# Patient Record
Sex: MALE | Employment: OTHER | URBAN - METROPOLITAN AREA
[De-identification: names, ages, dates, MRNs, and addresses within clinical notes are randomized per-mention and may not be internally consistent; named-entity substitution may affect disease eponyms.]

---

## 2022-06-27 ENCOUNTER — OFFICE VISIT (OUTPATIENT)
Dept: NEUROLOGY | Age: 77
End: 2022-06-27
Payer: OTHER GOVERNMENT

## 2022-06-27 VITALS
SYSTOLIC BLOOD PRESSURE: 133 MMHG | WEIGHT: 158 LBS | HEIGHT: 70 IN | BODY MASS INDEX: 22.62 KG/M2 | DIASTOLIC BLOOD PRESSURE: 85 MMHG | HEART RATE: 99 BPM

## 2022-06-27 DIAGNOSIS — R29.6 FREQUENT FALLS: ICD-10-CM

## 2022-06-27 DIAGNOSIS — G62.9 PERIPHERAL POLYNEUROPATHY: ICD-10-CM

## 2022-06-27 DIAGNOSIS — R42 DIZZINESS: ICD-10-CM

## 2022-06-27 DIAGNOSIS — I95.1 POSTURAL HYPOTENSION: ICD-10-CM

## 2022-06-27 DIAGNOSIS — R26.89 ABNORMALITY OF GAIT DUE TO IMPAIRMENT OF BALANCE: Primary | ICD-10-CM

## 2022-06-27 PROCEDURE — 1123F ACP DISCUSS/DSCN MKR DOCD: CPT | Performed by: PSYCHIATRY & NEUROLOGY

## 2022-06-27 PROCEDURE — 99205 OFFICE O/P NEW HI 60 MIN: CPT | Performed by: PSYCHIATRY & NEUROLOGY

## 2022-06-27 RX ORDER — ALOGLIPTIN 12.5 MG/1
12.5 TABLET, FILM COATED ORAL DAILY
COMMUNITY

## 2022-06-27 RX ORDER — METOPROLOL SUCCINATE 100 MG/1
100 TABLET, EXTENDED RELEASE ORAL DAILY
COMMUNITY

## 2022-06-27 RX ORDER — TRAZODONE HYDROCHLORIDE 100 MG/1
100 TABLET ORAL DAILY
COMMUNITY

## 2022-06-27 RX ORDER — LATANOPROST 50 UG/ML
1 SOLUTION/ DROPS OPHTHALMIC
COMMUNITY

## 2022-06-27 RX ORDER — ROSUVASTATIN CALCIUM 5 MG/1
5 TABLET, COATED ORAL
COMMUNITY

## 2022-06-27 RX ORDER — GLIPIZIDE 10 MG/1
10 TABLET ORAL
COMMUNITY

## 2022-06-27 RX ORDER — LEVOTHYROXINE SODIUM 137 UG/1
137 TABLET ORAL DAILY
COMMUNITY

## 2022-06-27 RX ORDER — BRIMONIDINE TARTRATE, TIMOLOL MALEATE 2; 5 MG/ML; MG/ML
SOLUTION/ DROPS OPHTHALMIC
COMMUNITY

## 2022-06-27 RX ORDER — LISINOPRIL 20 MG/1
20 TABLET ORAL DAILY
COMMUNITY

## 2022-06-27 RX ORDER — SERTRALINE HYDROCHLORIDE 100 MG/1
100 TABLET, FILM COATED ORAL DAILY
COMMUNITY

## 2022-06-27 ASSESSMENT — ENCOUNTER SYMPTOMS
ABDOMINAL PAIN: 0
COUGH: 0
BACK PAIN: 1

## 2022-06-27 NOTE — PATIENT INSTRUCTIONS
Your neurologic exam shows some findings that would be concerning for a peripheral neuropathy affecting her feet. I suspect this may be causing some of your balance issues and resulting in falls. I would like to bring you back for a nerve conduction study of your legs. This will be scheduled for you up front. They will also make you a follow-up to come back and see me afterwards so that we can further discuss the results. In the meantime I would recommend that she use a cane for assistance when walking.

## 2022-06-27 NOTE — PROGRESS NOTES
Josse Coe  [address] [place] [address] Dr, [facility], [address] [place] [address]  Phone: [phone] Fax [phone]  Cipriano Marino MD    Patient: Manasa Hyman. Provider: Cipriano Marino MD    CC:   Chief Complaint   Patient presents with    New Patient     Referring Provider:    History of Present Illness:     Manasa Hyman. is a 68 y.o. RH male who presents for further evaluation of falls. He is unaccompanied for today's visit. Patient is seen today for further evaluation of gait disturbance highlighted by balance difficulty and multiple falls. Symptoms are nonspecific but it appears that he has had several instances of falls over the last year. He actually states the symptoms have gradually improved since the time of the referral which is reassuring. Upon questioning, some of these findings have been related to postural dizziness. He describes one incident where he was in the cardiology office and he stood from his chair and became dizzy and fell backwards. He denies any syncope or any vertigo type symptoms. He does note that blood pressure may run low and there have been changes in his blood pressure medications recently. He is at other instances where he is tripped and these may become more problematic when he was rushed there are not being as careful. He says he has a tendency to have to  his feet. He is prone to tripping. In general he has unsteadiness of gait and frequent loss of balance. Patient has a cane at home but does not regularly use it. He does have some occasional low back pain but though this does not seem to be very problematic. Denies any numbness or tingling in the lower extremities. No weakness, numbness, or loss of dexterity in the hands. No diplopia, speech changes, swallowing disturbances. He does have some urinary urgency/frequency which is attributed to BPH. Review of Systems:   Review of Systems   Constitutional: Negative for fatigue.    HENT: Negative for congestion. Eyes: Negative for visual disturbance. Respiratory: Negative for cough. Gastrointestinal: Negative for abdominal pain. Genitourinary: Positive for frequency and urgency. Musculoskeletal: Positive for back pain and gait problem. Skin: Negative for rash. Allergic/Immunologic: Negative for immunocompromised state. Neurological: Positive for tremors and weakness. Psychiatric/Behavioral: Negative for confusion and hallucinations. Lab/Imaging Review:   I REVIEWED PERTINENT LABS, IMAGES, AND REPORTS WITH THE PATIENT PERSONALLY, DIRECTLY AND FULLY. THE MOST PERTINENT FINDINGS ARE NOTED BELOW:    N/A    Past Medical History:     Past medical history, surgical history, social history, family history, medications, and allergies were reviewed and updated as appropriate. PAST MEDICAL HISTORY:  Past Medical History:   Diagnosis Date    Generalized anxiety disorder     Hypertension     Hypothyroidism     Major depressive disorder     Mixed hyperlipidemia     Osteoarthritis     Type 2 diabetes mellitus ([facility] [address]      PAST SURGICAL HISTORY:   No past surgical history on file. FAMILY HISTORY:  No family history on file.      SOCIAL HISTORY:  Social History     Socioeconomic History    Marital status:      Spouse name: None    Number of children: None    Years of education: None    Highest education level: None   Occupational History    None   Tobacco Use    Smoking status: Former Smoker    Smokeless tobacco: Never Used   Substance and Sexual Activity    Alcohol use: None    Drug use: None    Sexual activity: None   Other Topics Concern    None   Social History Narrative    None     Social Determinants of Health     Financial Resource Strain:     Difficulty of Paying Living Expenses: Not on file   Food Insecurity:     Worried About Running Out of Food in the Last Year: Not on file    Oniel of Food in the Last Year: Not on file   Transportation Needs:  Lack of Transportation (Medical): Not on file    Lack of Transportation (Non-Medical):  Not on file   Physical Activity:     Days of Exercise per Week: Not on file    Minutes of Exercise per Session: Not on file   Stress:     Feeling of Stress : Not on file   Social Connections:     Frequency of Communication with Friends and Family: Not on file    Frequency of Social Gatherings with Friends and Family: Not on file    Attends Yarsanism Services: Not on file    Active Member of [address] [place] [address] or Organizations: Not on file    Attends Club or Organization Meetings: Not on file    Marital Status: Not on file   Intimate Partner Violence:     Fear of Current or Ex-Partner: Not on file    Emotionally Abused: Not on file    Physically Abused: Not on file    Sexually Abused: Not on file   Housing Stability:     Unable to Pay for Housing in the Last Year: Not on file    Number of [place] in the Last Year: Not on file    Unstable Housing in the Last Year: Not on file       Medications/Allergies:     MEDICATIONS:   Outpatient Encounter Medications as of 6/27/2022   Medication Sig Dispense Refill    traZODone (DESYREL) 100 MG tablet Take 100 mg by mouth daily      sertraline (ZOLOFT) 100 MG tablet Take 100 mg by mouth daily      apixaban (ELIQUIS) 5 MG TABS tablet Take 5 mg by mouth 2 times daily      metFORMIN (GLUCOPHAGE) 500 MG tablet Take 750 mg by mouth 2 times daily       levothyroxine (SYNTHROID) 137 MCG tablet Take 137 mcg by mouth daily      glipiZIDE (GLUCOTROL) 10 MG tablet Take 10 mg by mouth      lisinopril (PRINIVIL;ZESTRIL) 20 MG tablet Take 20 mg by mouth daily      rosuvastatin (CRESTOR) 5 MG tablet Take 5 mg by mouth      alogliptin (NESINA) 12.5 MG TABS tablet Take 12.5 mg by mouth daily      brimonidine-timolol (COMBIGAN) 0.2-0.5 % ophthalmic solution       cyanocobalamin 1000 MCG tablet 6000 mg daily      empagliflozin (JARDIANCE) 25 MG tablet Take 12.5 mg by mouth daily      latanoprost (XALATAN) 0.005 % ophthalmic solution 1 drop      metoprolol succinate (TOPROL XL) 100 MG extended release tablet Take 100 mg by mouth daily       No facility-administered encounter medications on file as of 6/27/2022. ALLERGIES:  Allergies   Allergen Reactions    Penicillins        Physical Exam:     /85   Pulse 99   Ht 5' 10\" (1.778 m)   Wt 158 lb (71.7 kg)   BMI 22.67 kg/m²     General Exam:  General: Well developed, well nourished, in no apparent distress. HEENT: Normocephalic, atraumatic. Sclera anicteric. Oropharynx clear. Neck: Supple without masses  Cardiovascular: Regular rate and rhythm. No carotid bruits. Lungs: Non-labored breathing. Abdomen: Soft, nontender, nondistended. Extremities: Peripheral pulses intact. No edema and no rashes. Neurological Exam:     MS/Language/Speech:  Alert. Oriented to person, place, and time. Language fluent. Speech normal.     Cranial Nerves: PERRL. Eye movements full with normal pursuits. No nystagmus. Face was symmetric with good activation and normal sensation. Tongue and palate were midline. Shoulder shrug symmetric. Motor: Strength was intact throughout with exception of mild hip flexor weakness bilaterally. Ankle plantarflexion and dorsiflexion are intact. Tone was normal.     Abnormal Movements: There was no tremor or hyperkinetic movements. No bradykinesia. Sensory: Absent vibratory sensation at the toes and ankles. Pinprick is relatively intact. Vibratory sensation intact in the hands. Cerebellar: No ataxia or dysmetria with finger-nose-finger bilaterally. Reflexes (R/L): Biceps (2+/2+), Brachioradialis (2+/2+), Patellar (2+/2+), Ankle (0+/0+). Bedolla's was negative and plantar responses were flexor. Gait: He is slow to rise from a seated position. He walks with a widened base and an unsteady gait with a somewhat staggering appearance. No parkinsonian features are observed.       Assessment and Plan: Corby Wilks. is a 68 y.o. male who presents with the following issues:     Georgi Astorga was seen today for new patient. Diagnoses and all orders for this visit:    Abnormality of gait due to impairment of balance    Peripheral polyneuropathy    Frequent falls    Dizziness    Postural hypotension      Patient presents for further evaluation of a gait disturbance highlighted by balance impairment and multiple falls. His neurologic exam shows evidence of significant sensory deficits in the lower extremities which would be concerning for a length dependent peripheral neuropathy. Patient does have a longstanding history of diabetes. I have recommended EMG/nerve conduction studies of the lower extremities for further evaluation. We discussed the possibility of orthostatic hypotension as an additional cause to his balance impairment and dizziness. Advised to monitor his blood pressure very conservative measures including staying hydrated, rising slowly from a seated position, compression hoses as needed, etc.  Given the other evidence of a neuropathic sensory findings in his lower extremities, a component of autonomic neuropathy could be possible. Serum lab work-up for other acquired causes could be considered but we will wait on the findings of the nerve studies. The remainder of his exam shows no evidence for a myelopathy, cerebellar syndrome, or parkinsonism. I have advised use of a cane for fall prevention. Formal trials of physical therapy could certainly be considered. Follow-up to be arranged. Signature: Makeda Farley MD      Date:  6/27/2022    [facility]   [facility] [address] [place] [address]  Ph: [phone]  Fax: [phone]         I have personally interviewed and examined Mr. Sridevi Platt and I have personally reviewed all relevant records including labs and imaging as noted above. I have written all aspects of this note.  More than 50% of this time was used for counseling regarding my diagnosis, prognosis, and plans for management. Total visit time: 64 minutes.

## 2022-08-16 ENCOUNTER — OFFICE VISIT (OUTPATIENT)
Dept: NEUROLOGY | Age: 77
End: 2022-08-16
Payer: OTHER GOVERNMENT

## 2022-08-16 DIAGNOSIS — R29.6 FREQUENT FALLS: ICD-10-CM

## 2022-08-16 DIAGNOSIS — R20.2 NUMBNESS AND TINGLING OF BOTH FEET: ICD-10-CM

## 2022-08-16 DIAGNOSIS — R42 DIZZINESS: ICD-10-CM

## 2022-08-16 DIAGNOSIS — G62.9 PERIPHERAL POLYNEUROPATHY: Primary | ICD-10-CM

## 2022-08-16 DIAGNOSIS — R20.0 NUMBNESS AND TINGLING OF BOTH FEET: ICD-10-CM

## 2022-08-16 PROCEDURE — 95885 MUSC TST DONE W/NERV TST LIM: CPT | Performed by: PSYCHIATRY & NEUROLOGY

## 2022-08-16 PROCEDURE — 95910 NRV CNDJ TEST 7-8 STUDIES: CPT | Performed by: PSYCHIATRY & NEUROLOGY

## 2022-08-16 NOTE — PROGRESS NOTES
EMG/Nerve Conduction Study Procedure Note  [address]    [facility], [address]   [phone]      Hx:    Exam:     68 y.o.r.h. male referred for EMG/NCV by Joana Riggs M.D. Pt has hx of ataxic gait with weakness. +DM2. Summary             needle EMG of selected lower extremity muscles = as selected below. Conduction velocity testing. Controlled environmental factors / EMG lab. Temperature. NCV : sensory segments:    Abnormal = amplitudes of the SNAP bilateral sural are abnormal small and attenuated. Equivocal slow SCB. NCV plantar sensory segments:    Deferred. NCV Motor MCV segments:     Abnormal = bilateral peroneal area abnormal and that the proximal segments are slowed there is some attenuation of the CMAP amplitudes. Bilateral tibial = again likely slowed at the proximal with some conduction blocking. F-wave studies:         Abnormal = the left peroneal is prolonged F waves the right peroneal equivocal the bilateral tibial basically normal F waves. H-REFLEX Studies:    abnormal the right H reflex is moderately slowed the left is even worse    NEEDLE EMG:   Tested muscles[de-identified]    Bilateral TA MG VL = the right MG is with some increased insertions with possible PSW and some giant RIP. Discrete. Similar on left. INTERPRETATION:         THESE FINDINGS ARE ELECTROPHYSIOLOGIC EVIDENCE OF DISTAL MIXED SENSORIMOTOR AXONAL DEMYELINATING POLYNEUROPATHY THAT COULD BE FROM 1 OF MANY CAUSES. THIS IS COMPATIBLE WITH HIS HISTORY. NO MYOPATHY OR MYOTONIA. NO FASCICULATIONS. CONCLUSION:      Compatible with a sensorimotor polyneuropathy lower extremities fairly symmetric axonal demyelinating. Procedure Details:       Correlates with the clinical history and the examination revealing very wide-based gait. Patient made aware. Please Note[de-identified]     Data and waveforms * filed under Procedure category [employer].     See Procedure Files for complete data pages.                Gayle Vasquez MD  Consultative Neurology, Neurodiagnostics   [facility]    [address] [place] [address], [address] [place] [address]  Phone:  [phone]  Fax:   [phone]          + + +   Glossary:   MUP: motor unit potential;  SNAP: sensory nerve action potential; Fibs:  Fibrillations; Fascic: fasciculations; IA: insertional activity;  IP: interference pattern;  SCV :  Sensory conduction velocity;  MCV: motor conduction velocity; NOTE[de-identified] muscles are abbreviated latin initials. Nicolet raw datafile[de-identified]   * filed at Procedure or [employer].  *

## 2022-08-30 ENCOUNTER — OFFICE VISIT (OUTPATIENT)
Dept: NEUROLOGY | Age: 77
End: 2022-08-30
Payer: OTHER GOVERNMENT

## 2022-08-30 VITALS
DIASTOLIC BLOOD PRESSURE: 78 MMHG | SYSTOLIC BLOOD PRESSURE: 114 MMHG | HEIGHT: 70 IN | BODY MASS INDEX: 22.33 KG/M2 | WEIGHT: 156 LBS | HEART RATE: 73 BPM

## 2022-08-30 DIAGNOSIS — R73.09 ELEVATED RANDOM BLOOD GLUCOSE LEVEL: ICD-10-CM

## 2022-08-30 DIAGNOSIS — G62.9 PERIPHERAL POLYNEUROPATHY: Primary | ICD-10-CM

## 2022-08-30 DIAGNOSIS — E53.8 B12 DEFICIENCY: ICD-10-CM

## 2022-08-30 DIAGNOSIS — R42 DIZZINESS: ICD-10-CM

## 2022-08-30 DIAGNOSIS — G62.9 PERIPHERAL POLYNEUROPATHY: ICD-10-CM

## 2022-08-30 DIAGNOSIS — R26.89 ABNORMALITY OF GAIT DUE TO IMPAIRMENT OF BALANCE: ICD-10-CM

## 2022-08-30 LAB
ERYTHROCYTE [SEDIMENTATION RATE] IN BLOOD: <1 MM/HR
EST. AVERAGE GLUCOSE BLD GHB EST-MCNC: 140 MG/DL
FOLATE SERPL-MCNC: 41.9 NG/ML (ref 3.1–17.5)
HBA1C MFR BLD: 6.5 % (ref 4.8–5.6)
TSH, 3RD GENERATION: 1.58 UIU/ML (ref 0.36–3.74)

## 2022-08-30 PROCEDURE — 99214 OFFICE O/P EST MOD 30 MIN: CPT | Performed by: PSYCHIATRY & NEUROLOGY

## 2022-08-30 PROCEDURE — 1123F ACP DISCUSS/DSCN MKR DOCD: CPT | Performed by: PSYCHIATRY & NEUROLOGY

## 2022-08-30 ASSESSMENT — ENCOUNTER SYMPTOMS
COUGH: 0
VOICE CHANGE: 0
ABDOMINAL PAIN: 0

## 2022-08-30 NOTE — PROGRESS NOTES
Calos Abel  [address], [address] [facility]  Phone: [phone] Fax [phone]  Nikia Kohler MD      Patient: Shivani Abdi. Provider: Nikia Kohler MD    CC:   Chief Complaint   Patient presents with    Results     Discuss EMGs    Neurologic Problem     Referring Provider:    History of Present Illness:     Shivani Corey is a 68 y.o. RH male who presents for follow-up of a gait disturbance in the setting of a peripheral polyneuropathy. Desmond Johnson He is unaccompanied for today's visit. Patient presents for follow-up and continued management of a gait disturbance highlighted by balance difficulty and history of falls. Some of these falls have been related to postural dizziness with no syncope or any other vertiginous symptoms. Other instances have been attributed to tripping and more problematic when he was rushed as he is not very careful. In general there is an unsteadiness of gait and loss of balance. Recent nerve conduction studies have shown evidence for a length dependent sensorimotor axonal polyneuropathy. It has been felt this is likely contributing to his gait and loss of balance. He does have some occasional low back pain but this has not been very problematic. No other bulbar symptoms. No upper extremity symptoms. He does have some urinary urgency/frequency which is attributed to BPH. Review of Systems:   Review of Systems   Constitutional:  Negative for fatigue. HENT:  Negative for voice change. Respiratory:  Negative for cough. Cardiovascular:  Negative for chest pain. Gastrointestinal:  Negative for abdominal pain. Genitourinary:  Positive for frequency and urgency. Musculoskeletal:  Positive for gait problem. Skin:  Negative for rash. Neurological:  Positive for tremors and weakness. Psychiatric/Behavioral:  Negative for decreased concentration.         Lab/Imaging Review:   I REVIEWED PERTINENT LABS, IMAGES, AND REPORTS WITH 0.2-0.5 % ophthalmic solution       cyanocobalamin 1000 MCG tablet 6000 mg daily      empagliflozin (JARDIANCE) 25 MG tablet Take 12.5 mg by mouth daily      latanoprost (XALATAN) 0.005 % ophthalmic solution 1 drop      metoprolol succinate (TOPROL XL) 100 MG extended release tablet Take 100 mg by mouth daily       No facility-administered encounter medications on file as of 8/30/2022. ALLERGIES:  Allergies   Allergen Reactions    Penicillins        Physical Exam:     /78 (Site: Left Upper Arm, Position: Sitting)   Pulse 73   Ht 5' 10\" (1.778 m)   Wt 156 lb (70.8 kg)   BMI 22.38 kg/m²     General Exam:  General: Well developed, well nourished, in no apparent distress. HEENT: Normocephalic, atraumatic. Sclera anicteric. Oropharynx clear. Neck: Supple without masses  Cardiovascular: Regular rate and rhythm. No carotid bruits. Lungs: Non-labored breathing. Abdomen: Soft, nontender, nondistended. Extremities: Peripheral pulses intact. No edema and no rashes. Neurological Exam:      MS/Language/Speech:  Alert. Oriented to person, place, and time. Language fluent. Speech normal.      Cranial Nerves: PERRL. Eye movements full with normal pursuits. No nystagmus. Face was symmetric with good activation and normal sensation. Tongue and palate were midline. Shoulder shrug symmetric. Motor: Strength was intact throughout with exception of mild hip flexor weakness bilaterally. Ankle plantarflexion and dorsiflexion are intact. Tone was normal.      Abnormal Movements: There was no tremor or hyperkinetic movements. No bradykinesia. Sensory: Absent vibratory sensation at the toes and ankles. Pinprick is relatively intact. Vibratory sensation intact in the hands. Cerebellar: No ataxia or dysmetria with finger-nose-finger bilaterally. Reflexes (R/L): Biceps (2+/2+), Brachioradialis (2+/2+), Patellar (2+/2+), Ankle (0+/0+). Bedolla's was negative and plantar responses were flexor. Gait: He is slow to rise from a seated position. He walks with a widened base and an unsteady gait with a somewhat staggering appearance. No parkinsonian features are observed. Assessment and Plan:     Jhonathan Cristina is a 68 y.o. male who presents with the following issues:     Radha Rodriguez was seen today for results and neurologic problem. Diagnoses and all orders for this visit:    Peripheral polyneuropathy  -     Vitamin B12; Future  -     Folate; Future  -     RAYMON, Direct, w/Reflex; Future  -     Sedimentation Rate; Future  -     Electrophoresis Protein, Serum; Future  -     TSH; Future  -     Hemoglobin A1C; Future    Abnormality of gait due to impairment of balance    Dizziness    Elevated random blood glucose level  -     Hemoglobin A1C; Future    B12 deficiency  -     Vitamin B12; Future      M presents for follow-up and continued management of balance disturbance likely secondary or at least exacerbated by the presence of a peripheral polyneuropathy now confirmed on nerve conduction studies. He does have a longstanding history of diabetes and I suspect this is the likely etiology. But we have recommended a serum lab work-up for other acquired causes as noted above. We have also discussed the possibility that orthostatic hypotension may also have been contributing to his balance impairment and resulted in some dizzy episodes that unfortunately led to falls. He has been advised on conservative measures including staying hydrated, rising slowly from a seated position, compression hoses as needed, etc.    The remainder of his neurologic exam shows no evidence for myelopathy, cerebellar syndrome, or parkinsonism. I have advised use of a cane for fall prevention. Formal trials of physical therapy could certainly be considered should symptoms worsen. .        Follow-up to be arranged as needed should symptoms worsen.        Signature: Terri Philip MD      Date:  8/30/2022    [facility]   [address], [address] [facility], [address] [place] [address]  Ph: [phone]  Fax: [phone]         I have personally interviewed and examined Mr. Benitez Mathew and I have personally reviewed all relevant records including labs and imaging as noted above. I have written all aspects of this note. More than 50% of this time was used for counseling regarding my diagnosis, prognosis, and plans for management. Total visit time: 33 minutes.

## 2022-08-31 LAB
ANA SER QL: NEGATIVE
VIT B12 SERPL-MCNC: 4386 PG/ML (ref 193–986)

## 2022-09-02 LAB
ALBUMIN SERPL ELPH-MCNC: 4.1 G/DL (ref 2.9–4.4)
ALBUMIN/GLOB SERPL: 1.7 {RATIO} (ref 0.7–1.7)
ALPHA1 GLOB SERPL ELPH-MCNC: 0.2 G/DL (ref 0–0.4)
ALPHA2 GLOB SERPL ELPH-MCNC: 0.7 G/DL (ref 0.4–1)
B-GLOBULIN SERPL ELPH-MCNC: 0.8 G/DL (ref 0.7–1.3)
GAMMA GLOB SERPL ELPH-MCNC: 0.7 G/DL (ref 0.4–1.8)
GLOBULIN SER CALC-MCNC: 2.4 G/DL (ref 2.2–3.9)
M PROTEIN SERPL ELPH-MCNC: NORMAL G/DL
PROT SERPL-MCNC: 6.5 G/DL (ref 6–8.5)